# Patient Record
Sex: MALE | Race: ASIAN
[De-identification: names, ages, dates, MRNs, and addresses within clinical notes are randomized per-mention and may not be internally consistent; named-entity substitution may affect disease eponyms.]

---

## 2023-06-30 ENCOUNTER — HOSPITAL ENCOUNTER (INPATIENT)
Dept: HOSPITAL 12 - TELE | Age: 33
LOS: 33 days | Discharge: LEFT BEFORE DISCHARGE | DRG: 951 | End: 2023-08-02
Payer: COMMERCIAL

## 2023-06-30 VITALS — DIASTOLIC BLOOD PRESSURE: 85 MMHG | SYSTOLIC BLOOD PRESSURE: 145 MMHG | TEMPERATURE: 98 F | OXYGEN SATURATION: 96 %

## 2023-06-30 VITALS — HEIGHT: 68 IN | WEIGHT: 210 LBS | BODY MASS INDEX: 31.83 KG/M2

## 2023-06-30 DIAGNOSIS — Z00.6: Primary | ICD-10-CM

## 2023-06-30 DIAGNOSIS — F20.9: ICD-10-CM

## 2023-06-30 PROCEDURE — A4663 DIALYSIS BLOOD PRESSURE CUFF: HCPCS

## 2023-06-30 PROCEDURE — G0378 HOSPITAL OBSERVATION PER HR: HCPCS

## 2023-06-30 NOTE — NUR
Received patient laying in bed Alert and Oriented X4. In no acute distress. Call light 
within reach, will continue to monitor and continue plan of care. Will contact  
with any change of condition.

## 2023-06-30 NOTE — NUR
Patient is alert, oriented x4, no sob, respirations are even nonlabored, skin warm and dry 
to touch, came to unit ambulatory, with research team. any concerns will contact dr nj.

## 2023-07-01 VITALS — SYSTOLIC BLOOD PRESSURE: 128 MMHG | TEMPERATURE: 98.5 F | DIASTOLIC BLOOD PRESSURE: 88 MMHG | OXYGEN SATURATION: 98 %

## 2023-07-01 VITALS — OXYGEN SATURATION: 97 % | DIASTOLIC BLOOD PRESSURE: 70 MMHG | TEMPERATURE: 98.3 F | SYSTOLIC BLOOD PRESSURE: 137 MMHG

## 2023-07-01 VITALS — DIASTOLIC BLOOD PRESSURE: 94 MMHG | SYSTOLIC BLOOD PRESSURE: 151 MMHG | OXYGEN SATURATION: 98 % | TEMPERATURE: 98 F

## 2023-07-01 RX ADMIN — Medication SCH EA: at 08:27

## 2023-07-01 RX ADMIN — Medication SCH EA: at 16:24

## 2023-07-02 VITALS — OXYGEN SATURATION: 99 % | SYSTOLIC BLOOD PRESSURE: 103 MMHG | DIASTOLIC BLOOD PRESSURE: 70 MMHG | TEMPERATURE: 98.7 F

## 2023-07-02 VITALS — SYSTOLIC BLOOD PRESSURE: 109 MMHG | DIASTOLIC BLOOD PRESSURE: 83 MMHG | TEMPERATURE: 97.9 F | OXYGEN SATURATION: 99 %

## 2023-07-02 VITALS — TEMPERATURE: 97.5 F | DIASTOLIC BLOOD PRESSURE: 80 MMHG | OXYGEN SATURATION: 100 % | SYSTOLIC BLOOD PRESSURE: 118 MMHG

## 2023-07-02 VITALS — SYSTOLIC BLOOD PRESSURE: 123 MMHG | OXYGEN SATURATION: 100 % | TEMPERATURE: 97.8 F | DIASTOLIC BLOOD PRESSURE: 80 MMHG

## 2023-07-02 VITALS — OXYGEN SATURATION: 100 % | DIASTOLIC BLOOD PRESSURE: 86 MMHG | SYSTOLIC BLOOD PRESSURE: 123 MMHG | TEMPERATURE: 97.8 F

## 2023-07-02 VITALS — SYSTOLIC BLOOD PRESSURE: 123 MMHG | DIASTOLIC BLOOD PRESSURE: 79 MMHG | OXYGEN SATURATION: 98 % | TEMPERATURE: 97.8 F

## 2023-07-02 RX ADMIN — Medication SCH EA: at 16:27

## 2023-07-02 RX ADMIN — Medication SCH EA: at 09:07

## 2023-07-02 NOTE — NUR
Spoke with lc tian for research- confiscated vape.  Instructed pt not to vape 
in the room.  Pt denies any  co pain. Pt cooperative with taking his pills. Pt denies any 
c/o pain.

## 2023-07-02 NOTE — NUR
Pt received sitting in bed in no distress requesting some snack. Provided snack and PO 
fluids tolerates well, denies any discomfort no distracted behavior observed. Pt did not 
want a hospital gown. at 0200 Pt uses his vaping in his room activation the alarm of the 
smoke detector. Pt understood no vaping inside of the room and agrees to handle his vaping 
to staff until AM.  No PRN requested after and Pt slept without any more distraction. 
Endorse care to incoming nurse

## 2023-07-03 VITALS — DIASTOLIC BLOOD PRESSURE: 71 MMHG | TEMPERATURE: 98.3 F | SYSTOLIC BLOOD PRESSURE: 113 MMHG | OXYGEN SATURATION: 99 %

## 2023-07-03 VITALS — DIASTOLIC BLOOD PRESSURE: 71 MMHG | SYSTOLIC BLOOD PRESSURE: 143 MMHG | OXYGEN SATURATION: 98 % | TEMPERATURE: 98.4 F

## 2023-07-03 VITALS — TEMPERATURE: 97.6 F | SYSTOLIC BLOOD PRESSURE: 121 MMHG | OXYGEN SATURATION: 99 % | DIASTOLIC BLOOD PRESSURE: 88 MMHG

## 2023-07-03 VITALS — TEMPERATURE: 97.6 F | OXYGEN SATURATION: 96 % | DIASTOLIC BLOOD PRESSURE: 91 MMHG | SYSTOLIC BLOOD PRESSURE: 120 MMHG

## 2023-07-03 RX ADMIN — Medication SCH EA: at 16:43

## 2023-07-03 RX ADMIN — Medication SCH EA: at 09:21

## 2023-07-03 NOTE — NUR
No EPS noted. Pt is in no acute distress.  Pt had a shower today.  Pt more awake alert and 
walking about.  Reinforce rules re: Vaping.  No vaping inside the room.

## 2023-07-03 NOTE — NUR
Received patient sleeping in bed. In no acute distress, Alert and Oriented X4. Fully 
ambulatory. Will continue to monitor and will continue plan of care.

## 2023-07-03 NOTE — NUR
Pt received in bed taken a nap no respiratory distress observed,snack was provided to pt, 
and denies any discomfort No PRN medication was requested. Pt tolerates well PO intake, 
denies any abdominal discomfort. Pt slept well during the night. continue monitoring safety 
and endorse care to incoming nurse

## 2023-07-04 VITALS — TEMPERATURE: 98.3 F | SYSTOLIC BLOOD PRESSURE: 121 MMHG | DIASTOLIC BLOOD PRESSURE: 88 MMHG | OXYGEN SATURATION: 97 %

## 2023-07-04 VITALS — TEMPERATURE: 98 F | SYSTOLIC BLOOD PRESSURE: 122 MMHG | DIASTOLIC BLOOD PRESSURE: 75 MMHG | OXYGEN SATURATION: 98 %

## 2023-07-04 VITALS — DIASTOLIC BLOOD PRESSURE: 83 MMHG | OXYGEN SATURATION: 99 % | TEMPERATURE: 98.6 F | SYSTOLIC BLOOD PRESSURE: 118 MMHG

## 2023-07-05 VITALS — TEMPERATURE: 97.4 F | OXYGEN SATURATION: 100 % | SYSTOLIC BLOOD PRESSURE: 123 MMHG | DIASTOLIC BLOOD PRESSURE: 92 MMHG

## 2023-07-05 VITALS — SYSTOLIC BLOOD PRESSURE: 123 MMHG | TEMPERATURE: 97.4 F | DIASTOLIC BLOOD PRESSURE: 92 MMHG | OXYGEN SATURATION: 100 %

## 2023-07-05 NOTE — NUR
DAY LEAVE: Patient left the unit as RN started the shift - this was from prior arrangement 

                 and agreement with MD

## 2023-07-05 NOTE — NUR
NIGHT NURSE REPORT RECEIVED:

1) MENTAL STATE: AO x 4 - No SOB, signs of pain, or distress.

2) BREATHING: Patient on RA - , sat in the high 90s. No sign of distress observed.

3) SAFETY: BRP - Bed in low position, Bed alarm not activated, floor free of clutter 

    and call bell within reach.

4) CIRCULATION: No signs of cyanosis observed.

5) MOBILITY: Fully mobile- independent.

6) BOWEL MOVEMENT: No Bowels at the time of this report. 

7) INFECTION CONTROL: Iv access - clean, dry, patent, and no sign of inflammation 

        or infection observed.

8) SKIN: Patient skin intact

9) PLAN: 

      (i) Will continue to treat patient accordingly.

      (ii) Patient going out for the day / day leave

## 2023-07-06 VITALS — TEMPERATURE: 97.9 F | OXYGEN SATURATION: 100 % | DIASTOLIC BLOOD PRESSURE: 77 MMHG | SYSTOLIC BLOOD PRESSURE: 120 MMHG

## 2023-07-06 VITALS — TEMPERATURE: 97.8 F | DIASTOLIC BLOOD PRESSURE: 88 MMHG | OXYGEN SATURATION: 100 % | SYSTOLIC BLOOD PRESSURE: 129 MMHG

## 2023-07-06 VITALS — TEMPERATURE: 97.8 F | DIASTOLIC BLOOD PRESSURE: 86 MMHG | OXYGEN SATURATION: 100 % | SYSTOLIC BLOOD PRESSURE: 129 MMHG

## 2023-07-06 NOTE — NUR
Research team, Melissa and 1 other, took pt downstairs for a smoking break. Pt stable. 
Ambulating with steady gait.

## 2023-07-06 NOTE — NUR
Received patient in his room, no complain of pain, calm and cooperative with care, assigned 
staff offered patient to smoke outside the building but patient refused, cont to monitor.

## 2023-07-06 NOTE — NUR
Patient came back from out on pass, brought by research team. Ambulatory, awake, alert and 
oriented

Vital signs taken and recorded

Observed accordingly, needs attended

## 2023-07-07 VITALS — TEMPERATURE: 97.9 F | SYSTOLIC BLOOD PRESSURE: 138 MMHG | OXYGEN SATURATION: 100 % | DIASTOLIC BLOOD PRESSURE: 91 MMHG

## 2023-07-07 VITALS — OXYGEN SATURATION: 99 % | SYSTOLIC BLOOD PRESSURE: 127 MMHG | DIASTOLIC BLOOD PRESSURE: 92 MMHG | TEMPERATURE: 97.8 F

## 2023-07-07 VITALS — DIASTOLIC BLOOD PRESSURE: 84 MMHG | OXYGEN SATURATION: 100 % | SYSTOLIC BLOOD PRESSURE: 132 MMHG | TEMPERATURE: 97.6 F

## 2023-07-07 VITALS — OXYGEN SATURATION: 98 % | TEMPERATURE: 97.6 F | DIASTOLIC BLOOD PRESSURE: 86 MMHG | SYSTOLIC BLOOD PRESSURE: 121 MMHG

## 2023-07-07 NOTE — NUR
Patient stayed in his room most of the night, no complain of pain, calm and cooperative with 
the care,  had shower last night, cont to monitor.

## 2023-07-07 NOTE — NUR
Pt returned from his smoke break with a member of the research team. Pt ambulating with 
steady gait. No s/s of distress noted. Will endorse to night nurse.

## 2023-07-07 NOTE — NUR
Patient in his room, he's in his phone, Research Staff offer patient if he wanted to go for 
smoke, patient refused to go, explain to patient that this is last time for the day when he 
can go out for smoke, patient able to understand.  Patient calm and cooperative with care, 
cont to monitor.

## 2023-07-07 NOTE — NUR
Received patient sleeping  in bed Alert and Oriented X4. In no acute distress. Call light 
within reach, will continue to monitor and continue plan of care. Will contact  
with any change of condition.

## 2023-07-08 VITALS — OXYGEN SATURATION: 99 % | TEMPERATURE: 98.2 F | DIASTOLIC BLOOD PRESSURE: 66 MMHG | SYSTOLIC BLOOD PRESSURE: 129 MMHG

## 2023-07-08 VITALS — SYSTOLIC BLOOD PRESSURE: 136 MMHG | DIASTOLIC BLOOD PRESSURE: 80 MMHG | OXYGEN SATURATION: 100 % | TEMPERATURE: 98.7 F

## 2023-07-08 VITALS — DIASTOLIC BLOOD PRESSURE: 87 MMHG | OXYGEN SATURATION: 98 % | TEMPERATURE: 98.2 F | SYSTOLIC BLOOD PRESSURE: 134 MMHG

## 2023-07-08 VITALS — TEMPERATURE: 98 F | OXYGEN SATURATION: 99 % | SYSTOLIC BLOOD PRESSURE: 117 MMHG | DIASTOLIC BLOOD PRESSURE: 86 MMHG

## 2023-07-08 NOTE — NUR
Patient asleep, calm and cooperative with care, patient has good sleep last night, cont to 
monitor.

## 2023-07-09 VITALS — SYSTOLIC BLOOD PRESSURE: 143 MMHG | TEMPERATURE: 98.2 F | OXYGEN SATURATION: 98 % | DIASTOLIC BLOOD PRESSURE: 91 MMHG

## 2023-07-09 VITALS — DIASTOLIC BLOOD PRESSURE: 90 MMHG | SYSTOLIC BLOOD PRESSURE: 137 MMHG | TEMPERATURE: 98.3 F | OXYGEN SATURATION: 98 %

## 2023-07-09 VITALS — DIASTOLIC BLOOD PRESSURE: 89 MMHG | TEMPERATURE: 98.3 F | OXYGEN SATURATION: 99 % | SYSTOLIC BLOOD PRESSURE: 147 MMHG

## 2023-07-09 VITALS — SYSTOLIC BLOOD PRESSURE: 136 MMHG | OXYGEN SATURATION: 99 % | DIASTOLIC BLOOD PRESSURE: 86 MMHG | TEMPERATURE: 98 F

## 2023-07-09 NOTE — NUR
Received patient aox4, patient sleeping at this time . No signs of acute distress noted.call 
light with in reach

## 2023-07-09 NOTE — NUR
Patient awake, in bed, in his phone. Patient has no complain of pain or discomfort, calm and 
cooperative with care, cont to monitor.

## 2023-07-10 VITALS — OXYGEN SATURATION: 100 % | TEMPERATURE: 98.4 F | DIASTOLIC BLOOD PRESSURE: 94 MMHG | SYSTOLIC BLOOD PRESSURE: 134 MMHG

## 2023-07-10 VITALS — TEMPERATURE: 98.4 F | DIASTOLIC BLOOD PRESSURE: 85 MMHG | SYSTOLIC BLOOD PRESSURE: 146 MMHG | OXYGEN SATURATION: 98 %

## 2023-07-10 VITALS — TEMPERATURE: 98.4 F | SYSTOLIC BLOOD PRESSURE: 132 MMHG | DIASTOLIC BLOOD PRESSURE: 94 MMHG | OXYGEN SATURATION: 100 %

## 2023-07-10 VITALS — SYSTOLIC BLOOD PRESSURE: 120 MMHG | OXYGEN SATURATION: 100 % | TEMPERATURE: 98.5 F | DIASTOLIC BLOOD PRESSURE: 84 MMHG

## 2023-07-11 VITALS — SYSTOLIC BLOOD PRESSURE: 116 MMHG | OXYGEN SATURATION: 99 % | DIASTOLIC BLOOD PRESSURE: 77 MMHG | TEMPERATURE: 98.4 F

## 2023-07-11 VITALS — OXYGEN SATURATION: 98 % | TEMPERATURE: 97.5 F | DIASTOLIC BLOOD PRESSURE: 81 MMHG | SYSTOLIC BLOOD PRESSURE: 123 MMHG

## 2023-07-11 NOTE — NUR
Rcvd pt in bed resting and doesn't want to be bothered. Pt. stable and comfortable. Had a 
late breakfast but consumed 90%. With calm demeanor but room is cluttered with clothes. 
Needs attended. Preferred the door closed.. Checked pt. as frequent as possible.

## 2023-07-11 NOTE — NUR
Pt calm and relaxed. All needs attended. Frequent visits made since pt. prefers the door 
closed. Reminded the pt about the disadvantage of cluttered room. Pt verbalized 
understanding.

## 2023-07-12 VITALS — DIASTOLIC BLOOD PRESSURE: 73 MMHG | OXYGEN SATURATION: 98 % | TEMPERATURE: 97.8 F | SYSTOLIC BLOOD PRESSURE: 134 MMHG

## 2023-07-12 VITALS — DIASTOLIC BLOOD PRESSURE: 77 MMHG | TEMPERATURE: 97.7 F | OXYGEN SATURATION: 99 % | SYSTOLIC BLOOD PRESSURE: 144 MMHG

## 2023-07-12 VITALS — SYSTOLIC BLOOD PRESSURE: 116 MMHG | DIASTOLIC BLOOD PRESSURE: 81 MMHG | OXYGEN SATURATION: 100 % | TEMPERATURE: 98 F

## 2023-07-12 NOTE — NUR
0730 - Patient not in room during rounds.



1130 - Patient back in room. Patient in no acute distress. Provided towels,toiletries as 
requested, pt states he wants to shower.



1515 - Patient went off floor, initially telling CNA that " he ordered food and will go down 
to ", notified Erik from research.

## 2023-07-12 NOTE — NUR
Admitted for research study. VSS No acute distress noted.

Calm and cooperative. Needs attended. No complaints noted.

## 2023-07-13 VITALS — OXYGEN SATURATION: 98 % | DIASTOLIC BLOOD PRESSURE: 73 MMHG | TEMPERATURE: 97.5 F | SYSTOLIC BLOOD PRESSURE: 134 MMHG

## 2023-07-13 VITALS — TEMPERATURE: 98.3 F | SYSTOLIC BLOOD PRESSURE: 131 MMHG | DIASTOLIC BLOOD PRESSURE: 94 MMHG | OXYGEN SATURATION: 96 %

## 2023-07-13 VITALS — OXYGEN SATURATION: 99 % | SYSTOLIC BLOOD PRESSURE: 124 MMHG | TEMPERATURE: 98.4 F | DIASTOLIC BLOOD PRESSURE: 85 MMHG

## 2023-07-14 VITALS — DIASTOLIC BLOOD PRESSURE: 93 MMHG | TEMPERATURE: 98.5 F | OXYGEN SATURATION: 98 % | SYSTOLIC BLOOD PRESSURE: 138 MMHG

## 2023-07-14 VITALS — OXYGEN SATURATION: 97 % | DIASTOLIC BLOOD PRESSURE: 85 MMHG | SYSTOLIC BLOOD PRESSURE: 130 MMHG | TEMPERATURE: 98.1 F

## 2023-07-14 VITALS — DIASTOLIC BLOOD PRESSURE: 86 MMHG | OXYGEN SATURATION: 99 % | SYSTOLIC BLOOD PRESSURE: 128 MMHG | TEMPERATURE: 98.4 F

## 2023-07-14 VITALS — DIASTOLIC BLOOD PRESSURE: 88 MMHG | TEMPERATURE: 98.5 F | SYSTOLIC BLOOD PRESSURE: 125 MMHG | OXYGEN SATURATION: 94 %

## 2023-07-15 VITALS — TEMPERATURE: 97.5 F | DIASTOLIC BLOOD PRESSURE: 90 MMHG | OXYGEN SATURATION: 100 % | SYSTOLIC BLOOD PRESSURE: 135 MMHG

## 2023-07-15 VITALS — TEMPERATURE: 98.4 F | SYSTOLIC BLOOD PRESSURE: 128 MMHG | DIASTOLIC BLOOD PRESSURE: 84 MMHG | OXYGEN SATURATION: 97 %

## 2023-07-15 VITALS — SYSTOLIC BLOOD PRESSURE: 125 MMHG | TEMPERATURE: 97.6 F | DIASTOLIC BLOOD PRESSURE: 88 MMHG | OXYGEN SATURATION: 100 %

## 2023-07-15 VITALS — DIASTOLIC BLOOD PRESSURE: 85 MMHG | SYSTOLIC BLOOD PRESSURE: 130 MMHG | OXYGEN SATURATION: 99 % | TEMPERATURE: 98.3 F

## 2023-07-15 NOTE — NUR
Nursing - In his room no distress, cooperative , pleasant, denies any discomfort , monitored 
needs ,call bell with in his reach.

## 2023-07-16 VITALS — TEMPERATURE: 98.2 F | OXYGEN SATURATION: 100 % | SYSTOLIC BLOOD PRESSURE: 126 MMHG | DIASTOLIC BLOOD PRESSURE: 89 MMHG

## 2023-07-16 VITALS — SYSTOLIC BLOOD PRESSURE: 118 MMHG | TEMPERATURE: 97.4 F | DIASTOLIC BLOOD PRESSURE: 85 MMHG | OXYGEN SATURATION: 98 %

## 2023-07-16 VITALS — SYSTOLIC BLOOD PRESSURE: 111 MMHG | OXYGEN SATURATION: 99 % | DIASTOLIC BLOOD PRESSURE: 81 MMHG | TEMPERATURE: 98.4 F

## 2023-07-16 NOTE — NUR
PATIENT ASLEEP IN BED. SLEPT WELL THROUGHOUT THE NIGHT. ALL NEEDS ATTENDED. WILL CONTINUE TO 
MONITOR AND ASSESS.

## 2023-07-16 NOTE — NUR
in the room, no distress noted, took shower x 2, pleasant and cooperative, all needs 
attended, call light within reach

## 2023-07-16 NOTE — NUR
up in the BR,steady fait, no distress noted, states will eat lunch in a little bit, tray in 
the room

## 2023-07-16 NOTE — NUR
resting in bed, no distress noted, calm, no tremors noted, eating and voiding qs, call light 
within reach, all needs attended and met

## 2023-07-17 VITALS — OXYGEN SATURATION: 99 % | TEMPERATURE: 98.2 F

## 2023-07-17 VITALS — OXYGEN SATURATION: 100 % | DIASTOLIC BLOOD PRESSURE: 91 MMHG | TEMPERATURE: 98 F | SYSTOLIC BLOOD PRESSURE: 139 MMHG

## 2023-07-17 VITALS — TEMPERATURE: 98.6 F | OXYGEN SATURATION: 100 % | DIASTOLIC BLOOD PRESSURE: 86 MMHG | SYSTOLIC BLOOD PRESSURE: 135 MMHG

## 2023-07-17 VITALS — DIASTOLIC BLOOD PRESSURE: 74 MMHG | TEMPERATURE: 98.1 F | SYSTOLIC BLOOD PRESSURE: 118 MMHG | OXYGEN SATURATION: 98 %

## 2023-07-17 VITALS — DIASTOLIC BLOOD PRESSURE: 87 MMHG | TEMPERATURE: 97.1 F | SYSTOLIC BLOOD PRESSURE: 130 MMHG | OXYGEN SATURATION: 100 %

## 2023-07-17 NOTE — NUR
v/s done WNL ,AAOX4/MAEX4. patient having dinner requested for orange juice ,tuna sandwich  
and ice water provided .

## 2023-07-18 VITALS — DIASTOLIC BLOOD PRESSURE: 86 MMHG | TEMPERATURE: 98.9 F | SYSTOLIC BLOOD PRESSURE: 132 MMHG | OXYGEN SATURATION: 98 %

## 2023-07-18 VITALS — SYSTOLIC BLOOD PRESSURE: 138 MMHG | TEMPERATURE: 98 F | DIASTOLIC BLOOD PRESSURE: 89 MMHG | OXYGEN SATURATION: 100 %

## 2023-07-18 VITALS — TEMPERATURE: 98.9 F | DIASTOLIC BLOOD PRESSURE: 92 MMHG | SYSTOLIC BLOOD PRESSURE: 133 MMHG | OXYGEN SATURATION: 98 %

## 2023-07-18 VITALS — SYSTOLIC BLOOD PRESSURE: 111 MMHG | TEMPERATURE: 97.8 F | OXYGEN SATURATION: 100 % | DIASTOLIC BLOOD PRESSURE: 86 MMHG

## 2023-07-18 VITALS — TEMPERATURE: 98.1 F | OXYGEN SATURATION: 97 % | SYSTOLIC BLOOD PRESSURE: 118 MMHG | DIASTOLIC BLOOD PRESSURE: 77 MMHG

## 2023-07-18 VITALS — TEMPERATURE: 98 F | SYSTOLIC BLOOD PRESSURE: 120 MMHG | DIASTOLIC BLOOD PRESSURE: 76 MMHG | OXYGEN SATURATION: 98 %

## 2023-07-18 NOTE — NUR
Received patient asleep in bed. No acute signs of distress, no SOB, no complain. Awake, 
alert and oriented. Vital signs taken and recorded. Attended

## 2023-07-19 VITALS — DIASTOLIC BLOOD PRESSURE: 71 MMHG | SYSTOLIC BLOOD PRESSURE: 114 MMHG | OXYGEN SATURATION: 99 % | TEMPERATURE: 98 F

## 2023-07-19 VITALS — DIASTOLIC BLOOD PRESSURE: 72 MMHG | OXYGEN SATURATION: 98 % | TEMPERATURE: 98 F | SYSTOLIC BLOOD PRESSURE: 122 MMHG

## 2023-07-19 VITALS — OXYGEN SATURATION: 97 % | SYSTOLIC BLOOD PRESSURE: 109 MMHG | TEMPERATURE: 98 F | DIASTOLIC BLOOD PRESSURE: 71 MMHG

## 2023-07-19 NOTE — NUR
Received patient endorsed from TRAMAINE Odonnell at 0145.  Patient is sleeping well.  Oriented x4, 
and no shortness of breath or acute respiratory distress noted.  Denies pain or discomfort.  
No Intravenous access noted.  Side rails up x 2, call light within reach.  All needs and 
concerns addressed by nursing staff. Will continue to monitor.

## 2023-07-19 NOTE — NUR
Patient came back. Vital signs taken and recorded. No acute signs of distress, no SOB. 
Monitored accordingly. Attended

## 2023-07-20 VITALS — SYSTOLIC BLOOD PRESSURE: 125 MMHG | DIASTOLIC BLOOD PRESSURE: 82 MMHG | OXYGEN SATURATION: 97 % | TEMPERATURE: 98.6 F

## 2023-07-20 VITALS — OXYGEN SATURATION: 98 % | SYSTOLIC BLOOD PRESSURE: 144 MMHG | TEMPERATURE: 98.9 F | DIASTOLIC BLOOD PRESSURE: 74 MMHG

## 2023-07-20 VITALS — DIASTOLIC BLOOD PRESSURE: 78 MMHG | OXYGEN SATURATION: 97 % | TEMPERATURE: 97.4 F | SYSTOLIC BLOOD PRESSURE: 121 MMHG

## 2023-07-20 NOTE — NUR
Received patient asleep in bed. No signs of distress. Awake, alert and oriented. call light 
with in reach

## 2023-07-20 NOTE — NUR
Pt.is asleep during rounds. Breathing is easy and unlabored. Denied pain when asked last 
night. VSS. Safety emphasized. Will continue to monitor.

## 2023-07-21 VITALS — SYSTOLIC BLOOD PRESSURE: 128 MMHG | TEMPERATURE: 98.5 F | OXYGEN SATURATION: 96 % | DIASTOLIC BLOOD PRESSURE: 90 MMHG

## 2023-07-21 VITALS — SYSTOLIC BLOOD PRESSURE: 117 MMHG | TEMPERATURE: 97.9 F | DIASTOLIC BLOOD PRESSURE: 84 MMHG | OXYGEN SATURATION: 98 %

## 2023-07-21 VITALS — TEMPERATURE: 97.9 F | DIASTOLIC BLOOD PRESSURE: 79 MMHG | OXYGEN SATURATION: 98 % | SYSTOLIC BLOOD PRESSURE: 115 MMHG

## 2023-07-21 VITALS — OXYGEN SATURATION: 96 % | TEMPERATURE: 98.8 F | DIASTOLIC BLOOD PRESSURE: 79 MMHG | SYSTOLIC BLOOD PRESSURE: 125 MMHG

## 2023-07-22 VITALS — OXYGEN SATURATION: 99 % | DIASTOLIC BLOOD PRESSURE: 82 MMHG | TEMPERATURE: 98 F | SYSTOLIC BLOOD PRESSURE: 119 MMHG

## 2023-07-22 NOTE — NUR
Pt.is asleep during rounds. Breathing is easy and unlabored. Safety emphasized. Will 
continue to monitor.

## 2023-07-23 VITALS — SYSTOLIC BLOOD PRESSURE: 118 MMHG | OXYGEN SATURATION: 99 % | TEMPERATURE: 98.2 F | DIASTOLIC BLOOD PRESSURE: 82 MMHG

## 2023-07-23 VITALS — DIASTOLIC BLOOD PRESSURE: 80 MMHG | OXYGEN SATURATION: 100 % | TEMPERATURE: 98.2 F | SYSTOLIC BLOOD PRESSURE: 126 MMHG

## 2023-07-23 VITALS — TEMPERATURE: 97.9 F | SYSTOLIC BLOOD PRESSURE: 144 MMHG | OXYGEN SATURATION: 94 % | DIASTOLIC BLOOD PRESSURE: 94 MMHG

## 2023-07-23 VITALS — TEMPERATURE: 97.2 F | DIASTOLIC BLOOD PRESSURE: 80 MMHG | OXYGEN SATURATION: 100 % | SYSTOLIC BLOOD PRESSURE: 123 MMHG

## 2023-07-23 NOTE — NUR
Patient awake in bed. He is comfortable. He is calm and relax with no s/s of tremors at 
time. He is alert and oriented x4, verbal, able to make his needs known.. Patient is 
ambulatory. No new changes reported. Will continue to monitor.

## 2023-07-24 VITALS — SYSTOLIC BLOOD PRESSURE: 123 MMHG | DIASTOLIC BLOOD PRESSURE: 70 MMHG | OXYGEN SATURATION: 99 % | TEMPERATURE: 97.5 F

## 2023-07-24 VITALS — OXYGEN SATURATION: 99 % | TEMPERATURE: 98 F | SYSTOLIC BLOOD PRESSURE: 123 MMHG | DIASTOLIC BLOOD PRESSURE: 71 MMHG

## 2023-07-24 VITALS — DIASTOLIC BLOOD PRESSURE: 75 MMHG | OXYGEN SATURATION: 99 % | SYSTOLIC BLOOD PRESSURE: 112 MMHG | TEMPERATURE: 98.6 F

## 2023-07-24 VITALS — TEMPERATURE: 98.3 F | SYSTOLIC BLOOD PRESSURE: 130 MMHG | OXYGEN SATURATION: 98 % | DIASTOLIC BLOOD PRESSURE: 63 MMHG

## 2023-07-25 VITALS — DIASTOLIC BLOOD PRESSURE: 86 MMHG | OXYGEN SATURATION: 99 % | SYSTOLIC BLOOD PRESSURE: 135 MMHG | TEMPERATURE: 98.1 F

## 2023-07-25 VITALS — SYSTOLIC BLOOD PRESSURE: 125 MMHG | TEMPERATURE: 98 F | OXYGEN SATURATION: 99 % | DIASTOLIC BLOOD PRESSURE: 85 MMHG

## 2023-07-25 VITALS — DIASTOLIC BLOOD PRESSURE: 70 MMHG | SYSTOLIC BLOOD PRESSURE: 102 MMHG | TEMPERATURE: 98.5 F | OXYGEN SATURATION: 98 %

## 2023-07-26 VITALS — TEMPERATURE: 97.6 F | DIASTOLIC BLOOD PRESSURE: 69 MMHG | OXYGEN SATURATION: 98 % | SYSTOLIC BLOOD PRESSURE: 103 MMHG

## 2023-07-26 VITALS — OXYGEN SATURATION: 98 % | DIASTOLIC BLOOD PRESSURE: 86 MMHG | SYSTOLIC BLOOD PRESSURE: 129 MMHG | TEMPERATURE: 97.2 F

## 2023-07-26 NOTE — NUR
rounds made patient in bed watching tv, aaox4, maex4 . patient denies pain when asked . no 
respiratory distress noted breathing even and unlabored .v/s done and wnl .

## 2023-07-26 NOTE — NUR
patient called and asked for some snack provided tuna sandwich , orange juice and apple 
juice ice water patient able to feed self .

## 2023-07-27 VITALS — TEMPERATURE: 98.1 F | DIASTOLIC BLOOD PRESSURE: 79 MMHG | SYSTOLIC BLOOD PRESSURE: 125 MMHG | OXYGEN SATURATION: 99 %

## 2023-07-27 VITALS — SYSTOLIC BLOOD PRESSURE: 121 MMHG | TEMPERATURE: 97.4 F | DIASTOLIC BLOOD PRESSURE: 77 MMHG | OXYGEN SATURATION: 98 %

## 2023-07-27 VITALS — TEMPERATURE: 98.1 F | SYSTOLIC BLOOD PRESSURE: 129 MMHG | DIASTOLIC BLOOD PRESSURE: 81 MMHG | OXYGEN SATURATION: 98 %

## 2023-07-27 VITALS — OXYGEN SATURATION: 97 % | TEMPERATURE: 97.8 F | DIASTOLIC BLOOD PRESSURE: 84 MMHG | SYSTOLIC BLOOD PRESSURE: 136 MMHG

## 2023-07-27 NOTE — NUR
RECEIVED IN BED AWAKE ALERT AND ANSWERS QUESTIONS APPROPRIATELY, CALM AND COOPERATIVE. 
CONTINUE OBSERVATION ,

## 2023-07-27 NOTE — NUR
PATIENT REMAINED IN ROOM MOST OF THE DAY QUIET, NO HALLUCINATION OBSERVED CONTINUE CARE 
UNDER RESEARCH  PROGRAM

## 2023-07-28 VITALS — TEMPERATURE: 98.1 F | SYSTOLIC BLOOD PRESSURE: 118 MMHG | DIASTOLIC BLOOD PRESSURE: 66 MMHG

## 2023-07-28 VITALS — OXYGEN SATURATION: 97 % | DIASTOLIC BLOOD PRESSURE: 60 MMHG | TEMPERATURE: 98.1 F | SYSTOLIC BLOOD PRESSURE: 130 MMHG

## 2023-07-28 VITALS — TEMPERATURE: 98.1 F | SYSTOLIC BLOOD PRESSURE: 125 MMHG | DIASTOLIC BLOOD PRESSURE: 72 MMHG | OXYGEN SATURATION: 97 %

## 2023-07-28 VITALS — TEMPERATURE: 98.8 F | DIASTOLIC BLOOD PRESSURE: 79 MMHG | SYSTOLIC BLOOD PRESSURE: 125 MMHG | OXYGEN SATURATION: 97 %

## 2023-07-28 NOTE — NUR
IN ROOM SLEEPING MOST OF THE SHIFT, NO SIGNS OF DISTRESS, PAIN OR AGITATION. COMPLIANT AND 
GOOD APPETITE WITH MEALS.

## 2023-07-29 VITALS — TEMPERATURE: 97.8 F | DIASTOLIC BLOOD PRESSURE: 81 MMHG | OXYGEN SATURATION: 97 % | SYSTOLIC BLOOD PRESSURE: 134 MMHG

## 2023-07-29 VITALS — OXYGEN SATURATION: 97 % | TEMPERATURE: 97.8 F | DIASTOLIC BLOOD PRESSURE: 79 MMHG | SYSTOLIC BLOOD PRESSURE: 118 MMHG

## 2023-07-29 NOTE — NUR
RECEIVED PATIENT IN BED AWAKE ALERT AND ORIENTED DENIES DISCOMFORTS RESTING COMFORTABLY WELL 
CALL LIGHTS AND PERSOANL BELONGINGS ARE WITHIN EASY REACH WILL CONTINUE TO OBSERVE.

## 2023-07-30 VITALS — TEMPERATURE: 98.1 F | SYSTOLIC BLOOD PRESSURE: 121 MMHG | OXYGEN SATURATION: 97 % | DIASTOLIC BLOOD PRESSURE: 69 MMHG

## 2023-07-30 VITALS — DIASTOLIC BLOOD PRESSURE: 76 MMHG | SYSTOLIC BLOOD PRESSURE: 115 MMHG | OXYGEN SATURATION: 98 % | TEMPERATURE: 98.2 F

## 2023-07-30 VITALS — DIASTOLIC BLOOD PRESSURE: 72 MMHG | OXYGEN SATURATION: 99 % | TEMPERATURE: 97.7 F | SYSTOLIC BLOOD PRESSURE: 107 MMHG

## 2023-07-30 VITALS — OXYGEN SATURATION: 97 % | TEMPERATURE: 98.4 F | DIASTOLIC BLOOD PRESSURE: 78 MMHG | SYSTOLIC BLOOD PRESSURE: 110 MMHG

## 2023-07-30 NOTE — NUR
PATIENT ASLEEP IN BED. EASILY AROUSABLE. SLEPT WELL THROUGHOUT THE NIGHT, WILL CONTINUE TO 
MONITOR AND ASSESS.

## 2023-07-30 NOTE — NUR
took shower this shift, taking fluids well, no distress noted, stayed in the room most of 
the time, no tremors noted, all needs attended and met, call light within reach

## 2023-07-30 NOTE — NUR
sleeping on rounds and arouses easily, no distress noted, vs stable, no tremors noted, 
breakfast served, safety measures in place

## 2023-07-31 VITALS — DIASTOLIC BLOOD PRESSURE: 92 MMHG | SYSTOLIC BLOOD PRESSURE: 137 MMHG | OXYGEN SATURATION: 98 % | TEMPERATURE: 98.2 F

## 2023-07-31 VITALS — SYSTOLIC BLOOD PRESSURE: 114 MMHG | DIASTOLIC BLOOD PRESSURE: 84 MMHG | TEMPERATURE: 97.6 F | OXYGEN SATURATION: 98 %

## 2023-07-31 VITALS — SYSTOLIC BLOOD PRESSURE: 108 MMHG | TEMPERATURE: 98 F | DIASTOLIC BLOOD PRESSURE: 80 MMHG | OXYGEN SATURATION: 98 %

## 2023-07-31 NOTE — NUR
Pt. was just staying inside the room the whole day. Consumed the meal 100% and asking for 
more extra sandwich. Did not ask for any PRN medication. No agitation noted. Pt has a calm 
demeanor and pleasant when talking. Needs attended

## 2023-07-31 NOTE — NUR
Rcvd pt in bed sleeping but arousable. Morning tray served but preferred to eat late. Needs 
attended. Will continue to monitor.

## 2023-08-01 VITALS — TEMPERATURE: 97.8 F | DIASTOLIC BLOOD PRESSURE: 76 MMHG | OXYGEN SATURATION: 98 % | SYSTOLIC BLOOD PRESSURE: 117 MMHG

## 2023-08-01 VITALS — SYSTOLIC BLOOD PRESSURE: 99 MMHG | OXYGEN SATURATION: 99 % | DIASTOLIC BLOOD PRESSURE: 64 MMHG | TEMPERATURE: 98 F

## 2023-08-01 VITALS — TEMPERATURE: 98.3 F | OXYGEN SATURATION: 100 % | DIASTOLIC BLOOD PRESSURE: 78 MMHG | SYSTOLIC BLOOD PRESSURE: 125 MMHG

## 2023-08-01 NOTE — NUR
PATIENT SLEPT WELL. DENIES HEARING ANY VOICES. COOPERATIVE WITH STAFF. VSS.  CALL LIGHT IN 
REACH. ALL NEEDS ATTENDED. WILL CONTINUE TO MONITOR AND ASSESS.

## 2023-08-01 NOTE — NUR
Received patient laying in bed Alert and Oriented X4. In no acute distress. Call light 
within reach, will continue to monitor and continue plan of care. Will contact  
with any change of condition.

-------------------------------------------------------------------------------

Addendum: 08/01/23 at 2311 by MIRANDA COWART RN

-------------------------------------------------------------------------------

Sharona time, charting 19:30, 08/01/2023

## 2023-08-01 NOTE — NUR
Rcvd pt bed sleeping flat. Preferred to take breakfast late. No complaint or did not ask for 
any medication. Pt with calm demeanor. Room is cluttered but patient teaching provided and 
pt. verbalized understanding. Needs attended and call light within reach.
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
## 2023-08-02 NOTE — NUR
Director of Research, Wallace, asked to keep contraband with him. Contraband collected from 
patient given to Wallace.
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
